# Patient Record
Sex: FEMALE | Race: BLACK OR AFRICAN AMERICAN | URBAN - METROPOLITAN AREA
[De-identification: names, ages, dates, MRNs, and addresses within clinical notes are randomized per-mention and may not be internally consistent; named-entity substitution may affect disease eponyms.]

---

## 2018-05-30 ENCOUNTER — HOSPITAL ENCOUNTER (OUTPATIENT)
Dept: MEDSURG UNIT | Facility: HOSPITAL | Age: 28
End: 2018-05-31
Attending: OBSTETRICS & GYNECOLOGY | Admitting: OBSTETRICS & GYNECOLOGY

## 2018-05-30 LAB
ABS NEUT (OLG): 7.28 X10(3)/MCL (ref 2.1–9.2)
ALBUMIN SERPL-MCNC: 3.8 GM/DL (ref 3.4–5)
ALBUMIN/GLOB SERPL: 1 RATIO (ref 1–2)
ALP SERPL-CCNC: 107 UNIT/L (ref 45–117)
ALT SERPL-CCNC: 28 UNIT/L (ref 12–78)
AMYLASE SERPL-CCNC: 30 UNIT/L (ref 25–115)
APPEARANCE, UA: CLEAR
AST SERPL-CCNC: 18 UNIT/L (ref 15–37)
B-HCG FREE SERPL-ACNC: 8289 MIU/ML
BACTERIA #/AREA URNS AUTO: ABNORMAL /[HPF]
BASOPHILS # BLD AUTO: 0.04 X10(3)/MCL
BASOPHILS NFR BLD AUTO: 0 %
BILIRUB SERPL-MCNC: 0.4 MG/DL (ref 0.2–1)
BILIRUB UR QL STRIP: NEGATIVE
BILIRUBIN DIRECT+TOT PNL SERPL-MCNC: 0.1 MG/DL
BILIRUBIN DIRECT+TOT PNL SERPL-MCNC: 0.3 MG/DL
BUN SERPL-MCNC: 9 MG/DL (ref 7–18)
CALCIUM SERPL-MCNC: 8.8 MG/DL (ref 8.5–10.1)
CHLORIDE SERPL-SCNC: 104 MMOL/L (ref 98–107)
CO2 SERPL-SCNC: 23 MMOL/L (ref 21–32)
COLOR UR: YELLOW
CREAT SERPL-MCNC: 0.6 MG/DL (ref 0.6–1.3)
EOSINOPHIL # BLD AUTO: 0.55 X10(3)/MCL
EOSINOPHIL NFR BLD AUTO: 5 %
ERYTHROCYTE [DISTWIDTH] IN BLOOD BY AUTOMATED COUNT: 15.5 % (ref 11.5–14.5)
GLOBULIN SER-MCNC: 3.9 GM/ML (ref 2.3–3.5)
GLUCOSE (UA): NORMAL
GLUCOSE SERPL-MCNC: 89 MG/DL (ref 74–106)
HCT VFR BLD AUTO: 35.2 % (ref 35–46)
HGB BLD-MCNC: 11 GM/DL (ref 12–16)
HGB UR QL STRIP: ABNORMAL MG/DL
HYALINE CASTS #/AREA URNS LPF: ABNORMAL /[LPF]
IMM GRANULOCYTES # BLD AUTO: 0.02 10*3/UL
IMM GRANULOCYTES NFR BLD AUTO: 0 %
KETONES UR QL STRIP: ABNORMAL
LEUKOCYTE ESTERASE UR QL STRIP: NEGATIVE
LIPASE SERPL-CCNC: 74 UNIT/L (ref 73–393)
LYMPHOCYTES # BLD AUTO: 2.39 X10(3)/MCL
LYMPHOCYTES NFR BLD AUTO: 22 % (ref 13–40)
MCH RBC QN AUTO: 21.8 PG (ref 26–34)
MCHC RBC AUTO-ENTMCNC: 31.3 GM/DL (ref 31–37)
MCV RBC AUTO: 69.8 FL (ref 80–100)
MONOCYTES # BLD AUTO: 0.71 X10(3)/MCL
MONOCYTES NFR BLD AUTO: 6 % (ref 4–12)
NEUTROPHILS # BLD AUTO: 7.28 X10(3)/MCL
NEUTROPHILS NFR BLD AUTO: 66 X10(3)/MCL
NITRITE UR QL STRIP: NEGATIVE
PH UR STRIP: 5.5 [PH] (ref 4.5–8)
PLATELET # BLD AUTO: 218 X10(3)/MCL (ref 130–400)
PMV BLD AUTO: 11.6 FL (ref 7.4–10.4)
POC BETA-HCG (QUAL): POSITIVE
POTASSIUM SERPL-SCNC: 3.7 MMOL/L (ref 3.5–5.1)
PROT SERPL-MCNC: 7.7 GM/DL (ref 6.4–8.2)
PROT UR QL STRIP: NEGATIVE
RBC # BLD AUTO: 5.04 X10(6)/MCL (ref 4–5.2)
RBC #/AREA URNS AUTO: >=100 /[HPF]
SODIUM SERPL-SCNC: 136 MMOL/L (ref 136–145)
SP GR UR STRIP: 1.02 (ref 1–1.03)
SQUAMOUS #/AREA URNS LPF: ABNORMAL /[LPF]
UROBILINOGEN UR STRIP-ACNC: NORMAL
WBC # SPEC AUTO: 11 X10(3)/MCL (ref 4.5–11)
WBC #/AREA URNS AUTO: ABNORMAL /HPF

## 2018-05-31 LAB
CROSSMATCH INTERPRETATION: NORMAL
PRODUCT READY: NORMAL

## 2018-06-15 ENCOUNTER — HISTORICAL (OUTPATIENT)
Dept: ADMINISTRATIVE | Facility: HOSPITAL | Age: 28
End: 2018-06-15

## 2018-06-15 LAB
HIV 1+2 AB+HIV1 P24 AG SERPL QL IA: NONREACTIVE
T PALLIDUM AB SER QL: NONREACTIVE

## 2022-04-10 ENCOUNTER — HISTORICAL (OUTPATIENT)
Dept: ADMINISTRATIVE | Facility: HOSPITAL | Age: 32
End: 2022-04-10

## 2022-04-26 VITALS
HEIGHT: 63 IN | DIASTOLIC BLOOD PRESSURE: 79 MMHG | WEIGHT: 194.69 LBS | SYSTOLIC BLOOD PRESSURE: 112 MMHG | BODY MASS INDEX: 34.5 KG/M2

## 2022-04-30 NOTE — ED PROVIDER NOTES
"   Patient:   Violet Sanderson            MRN: [id number]            FIN: [id number]               Age:   28 years     Sex:  Female     :  1990   Associated Diagnoses:   Ectopic pregnancy   Author:   Andrés Torres MD      Basic Information   Time seen: Date & time 2018 23:03:00.   History source: Patient.   Arrival mode: Private vehicle.   History limitation: None.   Additional information: Chief Complaint from Nursing Triage Note : Chief Complaint   2018 22:35 CDT      Chief Complaint           lower  right  sided  abdominal  pain  that  started   earlier  today  .      History of Present Illness   The patient presents with abdominal pain.  The onset was "today".  The course/duration of symptoms is constant.  The character of symptoms is achy and crampy.  The degree at onset was moderate.  The Location of pain at onset was right, lower and abdominal.  The degree at present is moderate.  The Location of pain at present is right, lower and abdominal.  Radiating pain: none. The exacerbating factor is none.  The relieving factor is none.  Therapy today: see nurses notes.  Risk factors consist of not recent surgery.  Associated symptoms: denies chest pain, denies nausea, denies vomiting, denies diarrhea, denies back pain, denies shortness of breath, denies fever and denies chills.        Review of Systems   Constitutional symptoms:  No fever, no chills.    Skin symptoms:  No rash,    Eye symptoms:  Vision unchanged.   ENMT symptoms:  No nasal congestion,    Respiratory symptoms:  No shortness of breath,    Cardiovascular symptoms:  No chest pain,    Gastrointestinal symptoms:  Negative except as documented in HPI.   Genitourinary symptoms:  No dysuria, no hematuria, no vaginal bleeding, no vaginal discharge.    Musculoskeletal symptoms:  No back pain,    Neurologic symptoms:  No headache,    Endocrine symptoms:  No polyuria,    Allergy/immunologic symptoms:  No recurrent infections,     "   Health Status   Allergies:    Allergic Reactions (Selected)  No Known Medication Allergies.   Medications: Per nurse's notes.      Past Medical/ Family/ Social History   Medical history: Reviewed as documented in chart.   Surgical history: Reviewed as documented in chart.   Social history:    Social & Psychosocial Habits    Alcohol  02/20/2015 Risk Assessment: Denies Alcohol Use    12/29/2017  Use: Never    Substance Abuse  02/20/2015 Risk Assessment: Denies Substance Abuse    12/29/2017  Use: Never    Tobacco  11/24/2014 Risk Assessment: Denies Tobacco Use    12/29/2017  Use: Never smoker  .      Physical Examination               Vital Signs   Vital Signs   5/30/2018 22:35 CDT      Temperature Oral          37 DegC                             Temperature Oral (calculated)             98.60 DegF                             Peripheral Pulse Rate     77 bpm                             Respiratory Rate          20 br/min                             SpO2                      100 %                             Oxygen Therapy            Room air                             Systolic Blood Pressure   130 mmHg                             Diastolic Blood Pressure  78 mmHg  .   General:  Alert, mild distress.    Skin:  Warm, dry, intact, no rash.    Head:  Normocephalic, atraumatic.    Neck:  Supple, trachea midline.    Eye:  Extraocular movements are intact.   Ears, nose, mouth and throat:  Oral mucosa moist.   Cardiovascular:  Regular rate and rhythm.   Respiratory:  Lungs are clear to auscultation, respirations are non-labored, breath sounds are equal, Symmetrical chest wall expansion.    Chest wall:  No tenderness.   Musculoskeletal:  Normal ROM, normal strength.    Gastrointestinal:  Soft, Non distended, Tenderness: Mild, right lower quadrant, Guarding: Negative, Rebound: Negative, Bowel sounds: Normal, Organomegaly: Negative, Signs: McBurney's negative, Dill's negative.    Neurological:  Alert and oriented to person,  place, time, and situation, No focal neurological deficit observed, normal speech observed.    Psychiatric:  Cooperative, appropriate mood & affect.       Medical Decision Making   Documents reviewed:  Emergency department nurses' notes.   Results review:  Lab results : Lab View   5/30/2018 23:07 CDT      Sodium Lvl                136 mmol/L                             Potassium Lvl             3.7 mmol/L                             Chloride                  104 mmol/L                             CO2                       23 mmol/L                             Calcium Lvl               8.8 mg/dL                             Glucose Lvl               89 mg/dL                             BUN                       9 mg/dL                             Creatinine                0.60 mg/dL                             eGFR-AA                   >105 mL/min                             eGFR-HUGH                  >105 mL/min                             Amylase Lvl               30 unit/L                             Bili Total                0.4 mg/dL                             Bili Direct               0.1 mg/dL                             Bili Indirect             0.3 mg/dL                             AST                       18 unit/L                             ALT                       28 unit/L                             Alk Phos                  107 unit/L                             Total Protein             7.7 gm/dL                             Albumin Lvl               3.8 gm/dL                             Globulin                  3.90 gm/mL  HI                             A/G Ratio                 1 ratio                             Lipase Lvl                74 unit/L                             Beta hCG Qnt              8,289.0 mIU/mL  NA                             WBC                       11.0 x10(3)/mcL                             RBC                       5.04 x10(6)/mcL                             Hgb                        11.0 gm/dL  LOW                             Hct                       35.2 %                             Platelet                  218 x10(3)/mcL                             MCV                       69.8 fL  LOW                             MCH                       21.8 pg  LOW                             MCHC                      31.3 gm/dL                             RDW                       15.5 %  HI                             MPV                       11.6 fL  HI                             Abs Neut                  7.28 x10(3)/mcL                             Neutro Auto               66 x10(3)/mcL  NA                             Lymph Auto                22 %                             Mono Auto                 6 %                             Eos Auto                  5  NA                             Abs Eos                   0.55 x10(3)/mcL  NA                             Basophil Auto             0  NA                             Abs Neutro                7.28 x10(3)/mcL  NA                             Abs Lymph                 2.39 x10(3)/mcL  NA                             Abs Mono                  0.71 x10(3)/mcL  NA                             Abs Baso                  0.04 x10(3)/mcL  NA                             IG%                       0 %  NA                             IG#                       0.0200  NA    5/30/2018 23:06 CDT      UA Appear                 Clear                             UA Color                  YELLOW                             UA Spec Grav              1.021                             UA Bili                   Negative                             UA pH                     5.5                             UA Urobilinogen           Normal                             UA Blood                  OVER mg/dL                             UA Glucose                Normal                             UA Ketones                Trace                             UA  Protein                Negative                             UA Nitrite                Negative                             UA Leuk Est               Negative                             UA WBC Interp             6-10 /HPF                             UA RBC Interp             >=100                             UA Bact Interp            Many                             UA Squam Epi Interp                                    UA Hyal Cast Interp       0-2                             U beta hCG Ql POC         Positive    .     Ultrasound pelvis:   No intrauterine gestation.   There is a heterogeneous structure in the right adnexa measuring 4.6 cm containing a gestational sac with yolk sac and fetal pole, 6 weeks and zero days.   Fetal heart rate detected at 157 beats per minute. A small amount of free fluid is seen.   The left ovary is unremarkable with doppler flow.    Impression  No intrauterine gestation. Live ectopic pregnancy in the right adnexa, 6 weeks and zero days. Small free pelvic fluid. Gynecologic consultation is necessary.      Reexamination/ Reevaluation   Time: 5/31/2018 00:30:00 .   Vital signs   results included from flowsheet : Vital Signs   5/31/2018 2:15 CDT       Temperature Tympanic      37 DegC                             Heart Rate Monitored      98 bpm                             Respiratory Rate          18 br/min                             SpO2                      100 %                             Oxygen Therapy            Room air                             Systolic Blood Pressure   121 mmHg                             Diastolic Blood Pressure  63 mmHg                             Mean Arterial Pressure, Cuff              82 mmHg     Course: improving.   Pain status: decreased.   Assessment: exam improved.   Notes: Sleeping upon arrival into room.      Impression and Plan   Diagnosis   Ectopic pregnancy ([id number] O00.90)      Calls-Consults   -  5/31/2018 00:45:00 , Bobbi Juarez MD  [facility], consult, Will come evaluate patient in ER.    Plan   Disposition: Admit to OR per GYN.    Counseled: Patient, Regarding diagnosis, Regarding diagnostic results, Regarding treatment plan, Patient indicated understanding of instructions.

## 2022-04-30 NOTE — OP NOTE
Patient:   Violet Sanderson            MRN: [id number]            FIN: [id number]               Age:   28 years     Sex:  Female     :  1990   Associated Diagnoses:   None   Author:   Bobbi Juarez MD      Date of surgery: 2018    Pre-op Diagnosis: Right ectopic pregnancy    Post-op Diagnosis: Right ectopic pregnancy, hemoperitoneum    Procedure: Laparoscopic Right Salpingectomy    Surgeon: Dr. Bobbi Juarez    Attending: Dr. Lety Mullen    Anesthesia: General endotracheal anesthesia    EBL: 5 cc    IVF: 800 cc    UOP: 50 cc    Specimen: Right fallopian tube      Findings:   Examination under anesthesia revealed a normal-sized, anteverted uterus.  Laparoscopy showed hemoperitoneum with about 30 cc of dark blood and clot in posterior cul-de-sac.  The uterus, left fallopian tube, and bilateral ovaries within normal limits.  Right fallopian tube grossly enlarged, with tubal pregnancy noted, nonruptured.  No active bleeding.  Normal liver and stomach.      Procedure:   Patient was taken to OR with IV fluids running.  Genereal anesthesia was obtained without difficulty.  The patient was placed in the dorsal lithotomy position in [facility].  Examination under anesthesia performed and the above mentioned findings noted.  The patient was prepped and draped and a time out was performed.  A ridley catheter was inserted.    A speculum was placed in the vagina.  The anterior lip of the cervix grasped with single tooth tenaculum.  The HUMI uterine manipulator was placed.  Attention was the paid to the abdomen.  A 5mm horizontal skin incision was made approximately 8 cm left lateral to umbilicus. Under direct visualization, a 5 mm trocar was introduced into the abdomen.  Intrabdominal placement was confirmed and CO2 was used to insufflate the abdomen.  A survey was performed that revealed the above mentioned findings, including hemoperitoneum.  Bilaterally, two 5 mm trocars were placed under direct  visualization in the lower quadrants.  An additional port was placed in the umbilicus under direct visualization.  Trendelenburg was obtained.  The pelvis as examined.  The pathology was noted to be within the right fallopian tube.  Minimal filmy adhesions from the ovary to fallopian tube were dissected with the Ligasure.      The right fallopian tube was elevated away from pelvic sidewall with an atraumatic grasper.  The distal fallopian tube was clamped, coagulated, and transected form the underlying mesosalpinx with the Ligasure device. The mesosalpinx was serially coagulated and cut to the cornua.  The tube and products of conception were completely freed and placed in the posterior cul-de-sac for later retrieval.      The 5 mm laparoscope was withdrawn, the umbilical skin incision was extended, and a 10 mm trocar was introduced into the umbilical incision under direct visualization.  A laparoscopic specimen retrieval bag was introduced into the abdomen.  The specimen was placed into the bag and the specimen, bag, and trocar were removed completely.  The abdomen and pelvis were thoroughly inspected.  Good hemostasis was noted at resection site.  The pelvis was irrigated.  The Bursey laparoscopic closure device was used to closed the 10 mm port site with 0 vicryl suture.  All instruments were removed from the abdomen and vagina.  The pneumoperitoneum was released, and correct instrument counts were confirmed.  Skin incision were closed with 4-0 vicryl suture in a subcuticular fashion.  The patient was taken to recovery room in stable condition.

## 2022-05-03 NOTE — HISTORICAL [facility] CERNER
This is a historical note converted from Brendan. Formatting and pictures may have been removed.  Please reference Brendan for original formatting and attached multimedia. Chief Complaint  lower right sided abdominal pain that started earlier today  History of Present Illness  29 yo  with unknown LMP presented to ER with complaint of abdominal pain.? She was noted to have beta hcg approximately 8,000 with right adnexal ectopic pregnancy.? She reports pain for the last 3 days.? Pain last night 5/10.? No vaginal bleeding.? Unsure of last menstrual period.? Reports a history of previously diagnosed ectopic pregnancy of the right adnexa in , with resolution and possible subsequent miscarriage.?  ?  Pmhx: none  Sur diagnostic laparoscopy for suspected right ectopic pregnancy  Social: denies ETOH, drugs, tobacco  Gyn: denies h/o STIs or abnormal pap smears  Ob hx: G1- ectopic vs. spontaneous , G2- current  NKDA  Review of Systems  Negative, except per HPI  Physical Exam  Vitals & Measurements  T:?37? ?C (Oral)? HR:?77(Peripheral)? RR:?20? BP:?130/78? SpO2:?100%?  HT:?157.48?cm? HT:?157.48?cm? WT:?82?kg? WT:?82?kg?  Gen: No acute distress  Abd: Soft, moderately tender in bilateral lower quadrants, no rebound or guarding  Ext: no edema  ?  Beta hc,289  ?  Ultrasound: No official read.? Right adnexal pregnancy of approximately 6 wga.? CRL present with + FHT.?  bpm.  Lab Results  Test Name Test Result Date/Time   WBC 11.0 x10(3)/mcL 2018 23:07 CDT   Hgb 11.0 gm/dL (Low) 2018 23:07 CDT   Hct 35.2 % 2018 23:07 CDT   Platelet 218 x10(3)/mcL 2018 23:07 CDT   Assessment/Plan  Abdominal pain  29 yo  with right ectopic pregnancy.?  ?  Patient currently stable.? Counseled on all options.? Given size of gestational sac and presence of fetal heart tones, ultimate decision to proceed to OR for laparoscopy.? Patient understands risks include, visceral damage, or vascular  bleeding, prolonged hospitalization, likely removal of unilateral fallopian tube.? She is amenable to blood transfusion if needed.? To OR for laparoscopic unilateral slapingectomy vs. salpingostomy.   Problem List/Past Medical History  Ongoing  None  Obesity  Historical  No qualifying data  Medications  No active medications  Allergies  No Known Medication Allergies  Social History  Alcohol - Denies Alcohol Use, 2015  Never, 2017  Substance Abuse - Denies Substance Abuse, 2015  Never, 2017  Tobacco - Denies Tobacco Use, 2014  Never smoker, 2017      28 BF  with right adnexal pregnancy - probably ruptured  I have met this patient and agree with the above history and physical.  She understands the procedure as described above with associated risks.  All questions have been answered and consents have been signed.  Procedure:? LSC right salpingectomy   PE continued  CV RRR  Lungs CTAb

## 2022-05-03 NOTE — HISTORICAL [facility] CERNER
This is a historical note converted from Brendan. Formatting and pictures may have been removed.  Please reference Brendan for original formatting and attached multimedia. Chief Complaint  2 week post op salpingectomy  History of Present Illness  29 yo  s/p laparoscopic salpingectomy on 2018 for ectopic pregnancy.? She is doing well without complaint today.? Patient is ambulating, voiding, tolerating regular diet.? Taking OCPs for contraception.? States she is seen by another OBGYN in [place].? She desires to return to their care.? She requests STI testing today.? Reports pap smears are up to date.  Review of Systems  Negative, except per HPI  Physical Exam  Vitals & Measurements  T:?36.8? ?C (Oral)? HR:?73(Peripheral)? RR:?20? BP:?109/68?  HT:?157?cm? HT:?157?cm? WT:?90?kg? WT:?90?kg? BMI:?36.51?  PHYSICAL EXAM  General: NAD, A/Ox3  Neck: supple, no thyromegaly palpated, no lymphadenopathy palpated  Respiratory: CTAB, no wheezes, rales, or rhonchi  Cardiovascular: RRR no murmurs, rubs, or gallops  Abdomen: soft, obese, nondistended, nontender to palpation, no hepatosplenomegaly, no masses palpated, normoactive bowel sounds, 4 laparoscopic port sites well healed, no erythema, edema or induration  Extremities: no edema, no calf tenderness  ?  Right tube, Salpingectomy:  - Ectopic tubal pregnancy.  ?   2014 NILM Pap  Assessment/Plan  29 yo  s/p laparoscopic salpingectomy on 2018 for ectopic pregnancy, doing well.  ?  - Meeting all post operative goals.  - Pathology reviewed; benign.  - STI testing performed.?  - Continue OCPs for contraception.  - Return to outside provider for future Gyn care as desires.? May return to [facility].   Problem List/Past Medical History  Ongoing  None  Obesity  Tobacco abuse  Tobacco user  Historical  No qualifying data  Procedure/Surgical History  Diagnostic Laparoscopic (2018), Excision of Right Fallopian Tube, Percutaneous Endoscopic Approach (2018),  Laparoscopic treatment of ectopic pregnancy; with salpingectomy and/or oophorectomy (2018), Resection of Products of Conception, Ectopic, Percutaneous Endoscopic Approach (2018),  section.  Medications  IBUPROFEN 800MG TABLETS, 800 mg= 1 tab(s), Oral, q8hr  OXYCODONE/ACETAMINOPHEN 5-325MG TAB,? ?Not taking  Sprintec 0.25 mg-35 mcg oral tablet, 1 tab(s), Oral, Daily, 11 refills,? ?Not taking  Allergies  No Known Medication Allergies  Social History  Alcohol - Denies Alcohol Use, 2015  Never, 2017  Substance Abuse - Denies Substance Abuse, 2015  Never, 2017  Tobacco - Denies Tobacco Use, 2014  Current every day smoker Use:. 4 per day., 06/15/2018  Never smoker, 2017  Health Maintenance  Health Maintenance  ???Pending?(in the next year)  ??? ??Due?  ??? ? ? ?Alcohol Misuse Screening due??06/15/18??and every 1??year(s)  ??? ? ? ?Smoking Cessation due??06/15/18??and every 1??year(s)  ??? ? ? ?Tetanus Vaccine due??06/15/18??and every 10??year(s)  ??? ??Due In Future?  ??? ? ? ?Influenza Vaccine not due until??10/02/18??and every 1??year(s)  ???Satisfied?(in the past 1 year)  ??? ??Satisfied?  ??? ? ? ?Blood Pressure Screening on??06/15/18.??Satisfied by Nica Benitez RN  ??? ? ? ?Body Mass Index Check on??06/15/18.??Satisfied by Nica Benitez RN  ??? ? ? ?Depression Screening on??06/15/18.??Satisfied by Nica Benitez RN  ??? ? ? ?Obesity Screening on??06/15/18.??Satisfied by Nica Benitez RN  ??? ? ? ?Tobacco Use Screening on??06/15/18.??Satisfied by So Benitez [place]Ne [place]  ?  ?     [1]?SURGICAL PATH FINAL REPORT; 2018 03:26 CDT   Reviewed the patients medical history, residents findings on physical exam, and the diagnosis with treatment plan. Care provided was reasonable and necessary.